# Patient Record
Sex: FEMALE | Race: BLACK OR AFRICAN AMERICAN | URBAN - METROPOLITAN AREA
[De-identification: names, ages, dates, MRNs, and addresses within clinical notes are randomized per-mention and may not be internally consistent; named-entity substitution may affect disease eponyms.]

---

## 2024-11-18 ENCOUNTER — LAB (OUTPATIENT)
Dept: LAB | Facility: LAB | Age: 24
End: 2024-11-18
Payer: COMMERCIAL

## 2024-11-18 ENCOUNTER — OFFICE VISIT (OUTPATIENT)
Dept: GASTROENTEROLOGY | Facility: HOSPITAL | Age: 24
End: 2024-11-18
Payer: COMMERCIAL

## 2024-11-18 VITALS
TEMPERATURE: 98.1 F | BODY MASS INDEX: 31.02 KG/M2 | HEART RATE: 74 BPM | RESPIRATION RATE: 17 BRPM | OXYGEN SATURATION: 99 % | WEIGHT: 177.9 LBS | SYSTOLIC BLOOD PRESSURE: 113 MMHG | DIASTOLIC BLOOD PRESSURE: 77 MMHG

## 2024-11-18 DIAGNOSIS — K59.09 INTERMITTENT CONSTIPATION: Primary | ICD-10-CM

## 2024-11-18 DIAGNOSIS — R10.32 INTERMITTENT LEFT LOWER QUADRANT ABDOMINAL PAIN: ICD-10-CM

## 2024-11-18 DIAGNOSIS — K59.09 INTERMITTENT CONSTIPATION: ICD-10-CM

## 2024-11-18 LAB
CRP SERPL-MCNC: 1.84 MG/DL
ERYTHROCYTE [SEDIMENTATION RATE] IN BLOOD BY WESTERGREN METHOD: 30 MM/H (ref 0–20)

## 2024-11-18 PROCEDURE — 36415 COLL VENOUS BLD VENIPUNCTURE: CPT

## 2024-11-18 PROCEDURE — 86140 C-REACTIVE PROTEIN: CPT

## 2024-11-18 PROCEDURE — 85652 RBC SED RATE AUTOMATED: CPT

## 2024-11-18 PROCEDURE — 99204 OFFICE O/P NEW MOD 45 MIN: CPT | Performed by: PHYSICIAN ASSISTANT

## 2024-11-18 PROCEDURE — 99214 OFFICE O/P EST MOD 30 MIN: CPT | Performed by: PHYSICIAN ASSISTANT

## 2024-11-18 RX ORDER — ACETAMINOPHEN 500 MG
1000 TABLET ORAL EVERY 6 HOURS PRN
COMMUNITY

## 2024-11-18 RX ORDER — ETONOGESTREL AND ETHINYL ESTRADIOL .12; .015 MG/D; MG/D
RING VAGINAL
COMMUNITY
Start: 2024-11-05

## 2024-11-18 RX ORDER — POLYETHYLENE GLYCOL 3350 17 G/17G
17 POWDER, FOR SOLUTION ORAL DAILY
Qty: 510 G | Refills: 0 | Status: SHIPPED | OUTPATIENT
Start: 2024-11-18 | End: 2025-11-18

## 2024-11-18 RX ORDER — ERGOCALCIFEROL 1.25 MG/1
1 CAPSULE ORAL
COMMUNITY
Start: 2024-11-07

## 2024-11-18 RX ORDER — DULOXETIN HYDROCHLORIDE 30 MG/1
30 CAPSULE, DELAYED RELEASE ORAL
COMMUNITY
Start: 2024-10-31

## 2024-11-18 RX ORDER — HYDROXYZINE HYDROCHLORIDE 25 MG/1
25 TABLET, FILM COATED ORAL NIGHTLY
COMMUNITY
Start: 2024-11-07

## 2024-11-18 RX ORDER — POLYETHYLENE GLYCOL 3350 17 G/17G
POWDER, FOR SOLUTION ORAL
Qty: 238 G | Refills: 0 | Status: SHIPPED | OUTPATIENT
Start: 2024-11-18

## 2024-11-18 ASSESSMENT — ENCOUNTER SYMPTOMS
DIARRHEA: 0
ABDOMINAL DISTENTION: 0
TROUBLE SWALLOWING: 0
DYSPHORIC MOOD: 0
JOINT SWELLING: 0
WEAKNESS: 0
EYE REDNESS: 0
APPETITE CHANGE: 1
BLOOD IN STOOL: 1
DYSURIA: 0
AGITATION: 0
VOMITING: 0
CONSTIPATION: 1
CONFUSION: 0
ARTHRALGIAS: 0
HEMATURIA: 0
ABDOMINAL PAIN: 1
UNEXPECTED WEIGHT CHANGE: 1
SORE THROAT: 0

## 2024-11-18 ASSESSMENT — LIFESTYLE VARIABLES
HOW MANY STANDARD DRINKS CONTAINING ALCOHOL DO YOU HAVE ON A TYPICAL DAY: 1 OR 2
HOW OFTEN DURING THE LAST YEAR HAVE YOU HAD A FEELING OF GUILT OR REMORSE AFTER DRINKING: NEVER
HOW OFTEN DO YOU HAVE SIX OR MORE DRINKS ON ONE OCCASION: MONTHLY
HOW OFTEN DURING THE LAST YEAR HAVE YOU FOUND THAT YOU WERE NOT ABLE TO STOP DRINKING ONCE YOU HAD STARTED: NEVER
AUDIT-C TOTAL SCORE: 5
HOW OFTEN DO YOU HAVE A DRINK CONTAINING ALCOHOL: 2-3 TIMES A WEEK
HAS A RELATIVE, FRIEND, DOCTOR, OR ANOTHER HEALTH PROFESSIONAL EXPRESSED CONCERN ABOUT YOUR DRINKING OR SUGGESTED YOU CUT DOWN: NO
SKIP TO QUESTIONS 9-10: 0
HOW OFTEN DURING THE LAST YEAR HAVE YOU BEEN UNABLE TO REMEMBER WHAT HAPPENED THE NIGHT BEFORE BECAUSE YOU HAD BEEN DRINKING: NEVER
AUDIT TOTAL SCORE: 5
HAVE YOU OR SOMEONE ELSE BEEN INJURED AS A RESULT OF YOUR DRINKING: NO
HOW OFTEN DURING THE LAST YEAR HAVE YOU NEEDED AN ALCOHOLIC DRINK FIRST THING IN THE MORNING TO GET YOURSELF GOING AFTER A NIGHT OF HEAVY DRINKING: NEVER
HOW OFTEN DURING THE LAST YEAR HAVE YOU FAILED TO DO WHAT WAS NORMALLY EXPECTED FROM YOU BECAUSE OF DRINKING: NEVER

## 2024-11-18 ASSESSMENT — PAIN SCALES - GENERAL: PAINLEVEL_OUTOF10: 0-NO PAIN

## 2024-11-18 NOTE — PATIENT INSTRUCTIONS
Go to lab and get lab drawn and get that stool kit.     your bowel cleanse and take as directed.    Then after the cleanse, you will have Miralax and fiber filled in pharmacy to start this maintenance regimen to keep you regular.    Schedule follow up with me in 2 months

## 2024-11-18 NOTE — PROGRESS NOTES
History Of Present Illness  Basil Estrada is a 24 y.o. female with h/o anxiety,depression is here for intermittent LLQ pain and constipation for past year.    Pt has seen her PCP back in April 2024 for left sided abdominal pain and they prescribed her Bentyl and Miralax.  Pt did not take the Bentyl but she did take the Miralax for 1 wk and she still felt like she did not empty that well.  Pt admits to seeing blood in the stool back in August, but has not seen anything since than. Pt denies any diarrhea, mucus in stool or   any unintentional weight loss.  She gained weight (15 lbs )     Labs measured in Jan 2024 -  Normal TSH,  CBC and BMP    Past Medical History  She has a past medical history of Body mass index (BMI) pediatric, 5th percentile to less than 85th percentile for age (04/30/2019) and Encounter for routine child health examination without abnormal findings (04/30/2019).    Surgical History  [place] teeth extraction ( at age 17)     Social History  She has no history on file for tobacco use, alcohol use, and drug use.  Smoke Hooka (tobacco) 3x/week  Social ETOH - 3-4 days/week (1 drink each time)  Marijuana - smokes 2x/week    Family History  No GI cancers     Allergies  Patient has no known allergies.      Review of Systems   Constitutional:  Positive for appetite change (decreased. feels full) and unexpected weight change (weight gain).   HENT:  Negative for sore throat and trouble swallowing.    Eyes:  Negative for redness.   Gastrointestinal:  Positive for abdominal pain (intermittent LLQ), blood in stool (3 months ago, but has stopped) and constipation. Negative for abdominal distention, diarrhea and vomiting.   Genitourinary:  Negative for dysuria and hematuria.   Musculoskeletal:  Negative for arthralgias and joint swelling.   Skin:  Negative for rash.   Neurological:  Negative for weakness.   Psychiatric/Behavioral:  Negative for agitation, confusion and dysphoric mood.           /77    Pulse 74   Temp 36.7 °C (98.1 °F)   Resp 17   Wt 80.7 kg (177 lb 14.4 oz)   SpO2 99%   BMI 31.02 kg/m²   Physical Exam  Vitals reviewed.   Constitutional:       General: She is not in acute distress.     Appearance: She is not ill-appearing.   HENT:      Head: Normocephalic and atraumatic.      Mouth/Throat:      Mouth: Mucous membranes are moist.      Pharynx: Oropharynx is clear. No oropharyngeal exudate.   Eyes:      General: No scleral icterus.     Pupils: Pupils are equal, round, and reactive to light.   Cardiovascular:      Rate and Rhythm: Normal rate and regular rhythm.      Heart sounds: Normal heart sounds.   Pulmonary:      Effort: Pulmonary effort is normal.      Breath sounds: Normal breath sounds.   Abdominal:      General: Bowel sounds are normal. There is no distension.      Palpations: Abdomen is soft.      Tenderness: There is no abdominal tenderness. There is no guarding or rebound.   Musculoskeletal:         General: No swelling or deformity.      Cervical back: Neck supple.   Lymphadenopathy:      Cervical: No cervical adenopathy.   Skin:     General: Skin is warm.      Findings: No rash.   Neurological:      Mental Status: She is alert. Mental status is at baseline.   Psychiatric:         Mood and Affect: Mood normal.         Behavior: Behavior normal.         Thought Content: Thought content normal.       Relevant Results      Assessment/Plan:      1) Intermittent LLQ abd pain -  Based on history and presentation, pt has uncontrolled constipation. We will obtain ESR, CRP and fecal calprotectin, given remote history of hematochezia.   Will give pt a bowel cleanse with Miralax and start her on a daily regimen of fiber and Miralax to keep her Bms regulated. Also advised pt to limit intake of dairy/fast foods.     We can consider further workup with motility studies or colonoscopy in future, pending on how she responds to above treatment and lab results.    Follow up in 2 months    Marlene SHARMA  FRANKLIN [facility]agne